# Patient Record
Sex: MALE | Race: WHITE
[De-identification: names, ages, dates, MRNs, and addresses within clinical notes are randomized per-mention and may not be internally consistent; named-entity substitution may affect disease eponyms.]

---

## 2017-12-22 ENCOUNTER — HOSPITAL ENCOUNTER (INPATIENT)
Dept: HOSPITAL 8 - 4EST | Age: 82
LOS: 6 days | Discharge: NURSING, REHAB OR LONG-TERM CARE | DRG: 56 | End: 2017-12-28
Attending: HOSPITALIST | Admitting: HOSPITALIST
Payer: MEDICARE

## 2017-12-22 VITALS — SYSTOLIC BLOOD PRESSURE: 146 MMHG | DIASTOLIC BLOOD PRESSURE: 79 MMHG

## 2017-12-22 VITALS — SYSTOLIC BLOOD PRESSURE: 167 MMHG | DIASTOLIC BLOOD PRESSURE: 87 MMHG

## 2017-12-22 VITALS — BODY MASS INDEX: 19.89 KG/M2 | HEIGHT: 72 IN | WEIGHT: 146.83 LBS

## 2017-12-22 DIAGNOSIS — F01.51: ICD-10-CM

## 2017-12-22 DIAGNOSIS — G93.41: ICD-10-CM

## 2017-12-22 DIAGNOSIS — R32: ICD-10-CM

## 2017-12-22 DIAGNOSIS — K59.00: ICD-10-CM

## 2017-12-22 DIAGNOSIS — M54.30: ICD-10-CM

## 2017-12-22 DIAGNOSIS — E03.9: ICD-10-CM

## 2017-12-22 DIAGNOSIS — I35.0: ICD-10-CM

## 2017-12-22 DIAGNOSIS — Z90.49: ICD-10-CM

## 2017-12-22 DIAGNOSIS — I10: ICD-10-CM

## 2017-12-22 DIAGNOSIS — G30.9: Primary | ICD-10-CM

## 2017-12-22 DIAGNOSIS — F22: ICD-10-CM

## 2017-12-22 DIAGNOSIS — Z79.899: ICD-10-CM

## 2017-12-22 DIAGNOSIS — J84.9: ICD-10-CM

## 2017-12-22 DIAGNOSIS — Z90.79: ICD-10-CM

## 2017-12-22 DIAGNOSIS — F32.9: ICD-10-CM

## 2017-12-22 DIAGNOSIS — H91.93: ICD-10-CM

## 2017-12-22 DIAGNOSIS — Z79.82: ICD-10-CM

## 2017-12-22 DIAGNOSIS — Z86.73: ICD-10-CM

## 2017-12-22 DIAGNOSIS — Z95.5: ICD-10-CM

## 2017-12-22 DIAGNOSIS — E78.5: ICD-10-CM

## 2017-12-22 DIAGNOSIS — Z86.79: ICD-10-CM

## 2017-12-22 DIAGNOSIS — F05: ICD-10-CM

## 2017-12-22 DIAGNOSIS — Z66: ICD-10-CM

## 2017-12-22 DIAGNOSIS — Z85.46: ICD-10-CM

## 2017-12-22 LAB
ANION GAP SERPL CALC-SCNC: 8 MMOL/L (ref 5–15)
BASOPHILS # BLD AUTO: 0.07 X10^3/UL (ref 0–0.1)
BASOPHILS NFR BLD AUTO: 1 % (ref 0–1)
CALCIUM SERPL-MCNC: 8.6 MG/DL (ref 8.5–10.1)
CHLORIDE SERPL-SCNC: 107 MMOL/L (ref 98–107)
CREAT SERPL-MCNC: 1 MG/DL (ref 0.7–1.3)
CULTURE INDICATED?: NO
EOSINOPHIL # BLD AUTO: 0.07 X10^3/UL (ref 0–0.4)
EOSINOPHIL NFR BLD AUTO: 1 % (ref 1–7)
ERYTHROCYTE [DISTWIDTH] IN BLOOD BY AUTOMATED COUNT: 15.3 % (ref 9.4–14.8)
LYMPHOCYTES # BLD AUTO: 0.84 X10^3/UL (ref 1–3.4)
LYMPHOCYTES NFR BLD AUTO: 10 % (ref 22–44)
MCH RBC QN AUTO: 32.1 PG (ref 27.5–34.5)
MCHC RBC AUTO-ENTMCNC: 33.9 G/DL (ref 33.2–36.2)
MCV RBC AUTO: 95 FL (ref 81–97)
MD: NO
MICROSCOPIC: (no result)
MONOCYTES # BLD AUTO: 0.8 X10^3/UL (ref 0.2–0.8)
MONOCYTES NFR BLD AUTO: 9 % (ref 2–9)
NEUTROPHILS # BLD AUTO: 6.88 X10^3/UL (ref 1.8–6.8)
NEUTROPHILS NFR BLD AUTO: 79 % (ref 42–75)
PLATELET # BLD AUTO: 159 X10^3/UL (ref 130–400)
PMV BLD AUTO: 8.8 FL (ref 7.4–10.4)
RBC # BLD AUTO: 4.43 X10^6/UL (ref 4.38–5.82)
T4 FREE SERPL-MCNC: 0.97 NG/DL (ref 0.76–1.46)
TROPONIN I SERPL-MCNC: 0.05 NG/ML (ref 0–0.04)
TROPONIN I SERPL-MCNC: 0.07 NG/ML (ref 0–0.04)
TSH SERPL-ACNC: 18.7 MIU/L (ref 0.36–3.74)

## 2017-12-22 PROCEDURE — 85025 COMPLETE CBC W/AUTO DIFF WBC: CPT

## 2017-12-22 PROCEDURE — 82565 ASSAY OF CREATININE: CPT

## 2017-12-22 PROCEDURE — 81001 URINALYSIS AUTO W/SCOPE: CPT

## 2017-12-22 PROCEDURE — 84443 ASSAY THYROID STIM HORMONE: CPT

## 2017-12-22 PROCEDURE — 71010: CPT

## 2017-12-22 PROCEDURE — 0T9B70Z DRAINAGE OF BLADDER WITH DRAINAGE DEVICE, VIA NATURAL OR ARTIFICIAL OPENING: ICD-10-PCS | Performed by: NURSE PRACTITIONER

## 2017-12-22 PROCEDURE — 80053 COMPREHEN METABOLIC PANEL: CPT

## 2017-12-22 PROCEDURE — 80048 BASIC METABOLIC PNL TOTAL CA: CPT

## 2017-12-22 PROCEDURE — 95819 EEG AWAKE AND ASLEEP: CPT

## 2017-12-22 PROCEDURE — 36415 COLL VENOUS BLD VENIPUNCTURE: CPT

## 2017-12-22 PROCEDURE — 84484 ASSAY OF TROPONIN QUANT: CPT

## 2017-12-22 PROCEDURE — 84439 ASSAY OF FREE THYROXINE: CPT

## 2017-12-22 RX ADMIN — SODIUM CHLORIDE SCH MLS/HR: 0.9 INJECTION, SOLUTION INTRAVENOUS at 18:09

## 2017-12-22 RX ADMIN — ENOXAPARIN SODIUM SCH MG: 40 INJECTION SUBCUTANEOUS at 22:55

## 2017-12-23 VITALS — SYSTOLIC BLOOD PRESSURE: 174 MMHG | DIASTOLIC BLOOD PRESSURE: 86 MMHG

## 2017-12-23 VITALS — SYSTOLIC BLOOD PRESSURE: 158 MMHG | DIASTOLIC BLOOD PRESSURE: 78 MMHG

## 2017-12-23 VITALS — SYSTOLIC BLOOD PRESSURE: 150 MMHG | DIASTOLIC BLOOD PRESSURE: 83 MMHG

## 2017-12-23 LAB
ALBUMIN SERPL-MCNC: 3.2 G/DL (ref 3.4–5)
ALP SERPL-CCNC: 65 U/L (ref 45–117)
ALT SERPL-CCNC: 40 U/L (ref 12–78)
ANION GAP SERPL CALC-SCNC: 10 MMOL/L (ref 5–15)
BASOPHILS # BLD AUTO: 0 X10^3/UL (ref 0–0.1)
BASOPHILS NFR BLD AUTO: 0 % (ref 0–1)
BILIRUB SERPL-MCNC: 1 MG/DL (ref 0.2–1)
CALCIUM SERPL-MCNC: 8.7 MG/DL (ref 8.5–10.1)
CHLORIDE SERPL-SCNC: 107 MMOL/L (ref 98–107)
CREAT SERPL-MCNC: 0.85 MG/DL (ref 0.7–1.3)
EOSINOPHIL # BLD AUTO: 0.07 X10^3/UL (ref 0–0.4)
EOSINOPHIL NFR BLD AUTO: 1 % (ref 1–7)
ERYTHROCYTE [DISTWIDTH] IN BLOOD BY AUTOMATED COUNT: 15.2 % (ref 9.4–14.8)
LYMPHOCYTES # BLD AUTO: 0.81 X10^3/UL (ref 1–3.4)
LYMPHOCYTES NFR BLD AUTO: 8 % (ref 22–44)
MCH RBC QN AUTO: 32.6 PG (ref 27.5–34.5)
MCHC RBC AUTO-ENTMCNC: 34.4 G/DL (ref 33.2–36.2)
MCV RBC AUTO: 94.7 FL (ref 81–97)
MD: NO
MONOCYTES # BLD AUTO: 0.81 X10^3/UL (ref 0.2–0.8)
MONOCYTES NFR BLD AUTO: 8 % (ref 2–9)
NEUTROPHILS # BLD AUTO: 8.18 X10^3/UL (ref 1.8–6.8)
NEUTROPHILS NFR BLD AUTO: 83 % (ref 42–75)
PLATELET # BLD AUTO: 163 X10^3/UL (ref 130–400)
PMV BLD AUTO: 9.5 FL (ref 7.4–10.4)
PROT SERPL-MCNC: 6.8 G/DL (ref 6.4–8.2)
RBC # BLD AUTO: 4.48 X10^6/UL (ref 4.38–5.82)

## 2017-12-23 RX ADMIN — LEVOTHYROXINE SODIUM SCH MCG: 125 TABLET ORAL at 10:58

## 2017-12-23 RX ADMIN — SODIUM CHLORIDE SCH MLS/HR: 0.9 INJECTION, SOLUTION INTRAVENOUS at 03:34

## 2017-12-23 RX ADMIN — QUETIAPINE FUMARATE SCH MG: 25 TABLET ORAL at 18:00

## 2017-12-23 RX ADMIN — ENOXAPARIN SODIUM SCH MG: 40 INJECTION SUBCUTANEOUS at 22:10

## 2017-12-23 RX ADMIN — QUETIAPINE FUMARATE SCH MG: 25 TABLET ORAL at 15:30

## 2017-12-23 RX ADMIN — SODIUM CHLORIDE SCH MLS/HR: 0.9 INJECTION, SOLUTION INTRAVENOUS at 13:30

## 2017-12-23 RX ADMIN — QUETIAPINE FUMARATE SCH MG: 25 TABLET ORAL at 10:58

## 2017-12-23 RX ADMIN — MELATONIN TAB 3 MG SCH MG: 3 TAB at 21:00

## 2017-12-23 RX ADMIN — SERTRALINE HYDROCHLORIDE SCH MG: 50 TABLET ORAL at 10:58

## 2017-12-23 RX ADMIN — SODIUM CHLORIDE SCH MLS/HR: 0.9 INJECTION, SOLUTION INTRAVENOUS at 23:30

## 2017-12-24 VITALS — SYSTOLIC BLOOD PRESSURE: 164 MMHG | DIASTOLIC BLOOD PRESSURE: 76 MMHG

## 2017-12-24 VITALS — SYSTOLIC BLOOD PRESSURE: 161 MMHG | DIASTOLIC BLOOD PRESSURE: 80 MMHG

## 2017-12-24 VITALS — DIASTOLIC BLOOD PRESSURE: 79 MMHG | SYSTOLIC BLOOD PRESSURE: 143 MMHG

## 2017-12-24 VITALS — DIASTOLIC BLOOD PRESSURE: 85 MMHG | SYSTOLIC BLOOD PRESSURE: 164 MMHG

## 2017-12-24 RX ADMIN — QUETIAPINE FUMARATE SCH MG: 25 TABLET ORAL at 17:46

## 2017-12-24 RX ADMIN — SERTRALINE HYDROCHLORIDE SCH MG: 50 TABLET ORAL at 09:03

## 2017-12-24 RX ADMIN — MELATONIN TAB 3 MG SCH MG: 3 TAB at 21:00

## 2017-12-24 RX ADMIN — QUETIAPINE FUMARATE SCH MG: 25 TABLET ORAL at 12:02

## 2017-12-24 RX ADMIN — QUETIAPINE FUMARATE SCH MG: 25 TABLET ORAL at 09:03

## 2017-12-24 RX ADMIN — ENOXAPARIN SODIUM SCH MG: 40 INJECTION SUBCUTANEOUS at 21:49

## 2017-12-24 RX ADMIN — LEVOTHYROXINE SODIUM SCH MCG: 125 TABLET ORAL at 09:03

## 2017-12-24 RX ADMIN — SODIUM CHLORIDE SCH MLS/HR: 0.9 INJECTION, SOLUTION INTRAVENOUS at 15:56

## 2017-12-25 VITALS — DIASTOLIC BLOOD PRESSURE: 81 MMHG | SYSTOLIC BLOOD PRESSURE: 148 MMHG

## 2017-12-25 VITALS — SYSTOLIC BLOOD PRESSURE: 150 MMHG | DIASTOLIC BLOOD PRESSURE: 58 MMHG

## 2017-12-25 VITALS — SYSTOLIC BLOOD PRESSURE: 143 MMHG | DIASTOLIC BLOOD PRESSURE: 84 MMHG

## 2017-12-25 VITALS — DIASTOLIC BLOOD PRESSURE: 93 MMHG | SYSTOLIC BLOOD PRESSURE: 162 MMHG

## 2017-12-25 RX ADMIN — MELATONIN TAB 3 MG SCH MG: 3 TAB at 20:40

## 2017-12-25 RX ADMIN — SODIUM CHLORIDE SCH MLS/HR: 0.9 INJECTION, SOLUTION INTRAVENOUS at 02:13

## 2017-12-25 RX ADMIN — QUETIAPINE FUMARATE SCH MG: 25 TABLET ORAL at 08:39

## 2017-12-25 RX ADMIN — SERTRALINE HYDROCHLORIDE SCH MG: 50 TABLET ORAL at 08:39

## 2017-12-25 RX ADMIN — ENOXAPARIN SODIUM SCH MG: 40 INJECTION SUBCUTANEOUS at 20:39

## 2017-12-25 RX ADMIN — QUETIAPINE FUMARATE SCH MG: 25 TABLET ORAL at 18:36

## 2017-12-25 RX ADMIN — QUETIAPINE FUMARATE SCH MG: 25 TABLET ORAL at 12:55

## 2017-12-25 RX ADMIN — LEVOTHYROXINE SODIUM SCH MCG: 125 TABLET ORAL at 08:40

## 2017-12-26 VITALS — SYSTOLIC BLOOD PRESSURE: 145 MMHG | DIASTOLIC BLOOD PRESSURE: 72 MMHG

## 2017-12-26 VITALS — SYSTOLIC BLOOD PRESSURE: 144 MMHG | DIASTOLIC BLOOD PRESSURE: 74 MMHG

## 2017-12-26 VITALS — SYSTOLIC BLOOD PRESSURE: 173 MMHG | DIASTOLIC BLOOD PRESSURE: 75 MMHG

## 2017-12-26 VITALS — SYSTOLIC BLOOD PRESSURE: 178 MMHG | DIASTOLIC BLOOD PRESSURE: 69 MMHG

## 2017-12-26 LAB — CREAT SERPL-MCNC: 0.96 MG/DL (ref 0.7–1.3)

## 2017-12-26 RX ADMIN — MELATONIN TAB 3 MG SCH MG: 3 TAB at 19:59

## 2017-12-26 RX ADMIN — QUETIAPINE FUMARATE SCH MG: 25 TABLET ORAL at 08:17

## 2017-12-26 RX ADMIN — QUETIAPINE FUMARATE SCH MG: 25 TABLET ORAL at 18:07

## 2017-12-26 RX ADMIN — QUETIAPINE FUMARATE SCH MG: 25 TABLET ORAL at 12:08

## 2017-12-26 RX ADMIN — LEVOTHYROXINE SODIUM SCH MCG: 125 TABLET ORAL at 08:17

## 2017-12-26 RX ADMIN — SERTRALINE HYDROCHLORIDE SCH MG: 50 TABLET ORAL at 08:17

## 2017-12-26 RX ADMIN — ENOXAPARIN SODIUM SCH MG: 40 INJECTION SUBCUTANEOUS at 19:57

## 2017-12-27 VITALS — DIASTOLIC BLOOD PRESSURE: 99 MMHG | SYSTOLIC BLOOD PRESSURE: 178 MMHG

## 2017-12-27 VITALS — DIASTOLIC BLOOD PRESSURE: 84 MMHG | SYSTOLIC BLOOD PRESSURE: 188 MMHG

## 2017-12-27 VITALS — DIASTOLIC BLOOD PRESSURE: 87 MMHG | SYSTOLIC BLOOD PRESSURE: 148 MMHG

## 2017-12-27 VITALS — SYSTOLIC BLOOD PRESSURE: 150 MMHG | DIASTOLIC BLOOD PRESSURE: 87 MMHG

## 2017-12-27 VITALS — DIASTOLIC BLOOD PRESSURE: 91 MMHG | SYSTOLIC BLOOD PRESSURE: 153 MMHG

## 2017-12-27 RX ADMIN — QUETIAPINE FUMARATE SCH MG: 25 TABLET ORAL at 16:41

## 2017-12-27 RX ADMIN — QUETIAPINE FUMARATE SCH MG: 25 TABLET ORAL at 08:51

## 2017-12-27 RX ADMIN — ENOXAPARIN SODIUM SCH MG: 40 INJECTION SUBCUTANEOUS at 21:00

## 2017-12-27 RX ADMIN — SERTRALINE HYDROCHLORIDE SCH MG: 50 TABLET ORAL at 08:50

## 2017-12-27 RX ADMIN — LEVOTHYROXINE SODIUM SCH MCG: 125 TABLET ORAL at 08:51

## 2017-12-27 RX ADMIN — QUETIAPINE FUMARATE SCH MG: 25 TABLET ORAL at 11:35

## 2017-12-27 RX ADMIN — MELATONIN TAB 3 MG SCH MG: 3 TAB at 21:00

## 2017-12-28 VITALS — SYSTOLIC BLOOD PRESSURE: 153 MMHG | DIASTOLIC BLOOD PRESSURE: 77 MMHG

## 2017-12-28 VITALS — DIASTOLIC BLOOD PRESSURE: 81 MMHG | SYSTOLIC BLOOD PRESSURE: 143 MMHG

## 2017-12-28 RX ADMIN — QUETIAPINE FUMARATE SCH MG: 25 TABLET ORAL at 16:34

## 2017-12-28 RX ADMIN — LEVOTHYROXINE SODIUM SCH MCG: 125 TABLET ORAL at 09:51

## 2017-12-28 RX ADMIN — SERTRALINE HYDROCHLORIDE SCH MG: 50 TABLET ORAL at 09:50

## 2017-12-28 RX ADMIN — QUETIAPINE FUMARATE SCH MG: 25 TABLET ORAL at 09:50

## 2017-12-28 RX ADMIN — QUETIAPINE FUMARATE SCH MG: 25 TABLET ORAL at 12:25

## 2018-01-31 ENCOUNTER — HOSPITAL ENCOUNTER (INPATIENT)
Dept: HOSPITAL 8 - ED | Age: 83
LOS: 5 days | Discharge: NURSING, REHAB OR LONG-TERM CARE | DRG: 884 | End: 2018-02-05
Attending: INTERNAL MEDICINE | Admitting: INTERNAL MEDICINE
Payer: MEDICARE

## 2018-01-31 VITALS — DIASTOLIC BLOOD PRESSURE: 77 MMHG | SYSTOLIC BLOOD PRESSURE: 158 MMHG

## 2018-01-31 VITALS — WEIGHT: 137.57 LBS | HEIGHT: 70 IN | BODY MASS INDEX: 19.69 KG/M2

## 2018-01-31 DIAGNOSIS — F43.21: ICD-10-CM

## 2018-01-31 DIAGNOSIS — F43.24: ICD-10-CM

## 2018-01-31 DIAGNOSIS — F32.9: ICD-10-CM

## 2018-01-31 DIAGNOSIS — Z85.46: ICD-10-CM

## 2018-01-31 DIAGNOSIS — Z95.5: ICD-10-CM

## 2018-01-31 DIAGNOSIS — R33.8: ICD-10-CM

## 2018-01-31 DIAGNOSIS — E03.9: ICD-10-CM

## 2018-01-31 DIAGNOSIS — Z66: ICD-10-CM

## 2018-01-31 DIAGNOSIS — N40.1: ICD-10-CM

## 2018-01-31 DIAGNOSIS — I25.10: ICD-10-CM

## 2018-01-31 DIAGNOSIS — E44.1: ICD-10-CM

## 2018-01-31 DIAGNOSIS — D64.9: ICD-10-CM

## 2018-01-31 DIAGNOSIS — F01.51: Primary | ICD-10-CM

## 2018-01-31 DIAGNOSIS — G93.40: ICD-10-CM

## 2018-01-31 DIAGNOSIS — F41.9: ICD-10-CM

## 2018-01-31 DIAGNOSIS — F22: ICD-10-CM

## 2018-01-31 DIAGNOSIS — I45.81: ICD-10-CM

## 2018-01-31 DIAGNOSIS — K22.2: ICD-10-CM

## 2018-01-31 DIAGNOSIS — Z86.79: ICD-10-CM

## 2018-01-31 LAB
ALBUMIN SERPL-MCNC: 3.3 G/DL (ref 3.4–5)
ANION GAP SERPL CALC-SCNC: 8 MMOL/L (ref 5–15)
BASOPHILS # BLD AUTO: 0.04 X10^3/UL (ref 0–0.1)
BASOPHILS NFR BLD AUTO: 1 % (ref 0–1)
CALCIUM SERPL-MCNC: 8.5 MG/DL (ref 8.5–10.1)
CHLORIDE SERPL-SCNC: 103 MMOL/L (ref 98–107)
CREAT SERPL-MCNC: 0.97 MG/DL (ref 0.7–1.3)
EOSINOPHIL # BLD AUTO: 0.07 X10^3/UL (ref 0–0.4)
EOSINOPHIL NFR BLD AUTO: 1 % (ref 1–7)
ERYTHROCYTE [DISTWIDTH] IN BLOOD BY AUTOMATED COUNT: 15.9 % (ref 9.4–14.8)
FOLATE SERPL-MCNC: 14.5 NG/ML (ref 3.1–17.5)
LYMPHOCYTES # BLD AUTO: 0.87 X10^3/UL (ref 1–3.4)
LYMPHOCYTES NFR BLD AUTO: 13 % (ref 22–44)
MCH RBC QN AUTO: 32.2 PG (ref 27.5–34.5)
MCHC RBC AUTO-ENTMCNC: 33.6 G/DL (ref 33.2–36.2)
MCV RBC AUTO: 95.8 FL (ref 81–97)
MD: NO
MICROSCOPIC: (no result)
MONOCYTES # BLD AUTO: 0.45 X10^3/UL (ref 0.2–0.8)
MONOCYTES NFR BLD AUTO: 7 % (ref 2–9)
NEUTROPHILS # BLD AUTO: 5.26 X10^3/UL (ref 1.8–6.8)
NEUTROPHILS NFR BLD AUTO: 79 % (ref 42–75)
PLATELET # BLD AUTO: 213 X10^3/UL (ref 130–400)
PMV BLD AUTO: 8.1 FL (ref 7.4–10.4)
RBC # BLD AUTO: 3.82 X10^6/UL (ref 4.38–5.82)
T4 (THYROXINE): 10.1 MCG/DL (ref 4.5–12.1)
TSH SERPL-ACNC: 0.32 MIU/L (ref 0.36–3.74)

## 2018-01-31 PROCEDURE — 81003 URINALYSIS AUTO W/O SCOPE: CPT

## 2018-01-31 PROCEDURE — 36415 COLL VENOUS BLD VENIPUNCTURE: CPT

## 2018-01-31 PROCEDURE — 84436 ASSAY OF TOTAL THYROXINE: CPT

## 2018-01-31 PROCEDURE — 80053 COMPREHEN METABOLIC PANEL: CPT

## 2018-01-31 PROCEDURE — 71045 X-RAY EXAM CHEST 1 VIEW: CPT

## 2018-01-31 PROCEDURE — 86480 TB TEST CELL IMMUN MEASURE: CPT

## 2018-01-31 PROCEDURE — 82746 ASSAY OF FOLIC ACID SERUM: CPT

## 2018-01-31 PROCEDURE — 82040 ASSAY OF SERUM ALBUMIN: CPT

## 2018-01-31 PROCEDURE — 85025 COMPLETE CBC W/AUTO DIFF WBC: CPT

## 2018-01-31 PROCEDURE — 84443 ASSAY THYROID STIM HORMONE: CPT

## 2018-01-31 PROCEDURE — 82607 VITAMIN B-12: CPT

## 2018-01-31 PROCEDURE — 80048 BASIC METABOLIC PNL TOTAL CA: CPT

## 2018-01-31 PROCEDURE — 93005 ELECTROCARDIOGRAM TRACING: CPT

## 2018-01-31 PROCEDURE — 99285 EMERGENCY DEPT VISIT HI MDM: CPT

## 2018-01-31 RX ADMIN — ACYCLOVIR SCH MG: 200 CAPSULE ORAL at 20:26

## 2018-01-31 RX ADMIN — MELATONIN TAB 3 MG SCH MG: 3 TAB at 20:27

## 2018-01-31 RX ADMIN — SODIUM CHLORIDE, PRESERVATIVE FREE SCH ML: 5 INJECTION INTRAVENOUS at 20:27

## 2018-01-31 RX ADMIN — ENOXAPARIN SODIUM SCH MG: 40 INJECTION SUBCUTANEOUS at 20:30

## 2018-01-31 RX ADMIN — QUETIAPINE FUMARATE SCH MG: 25 TABLET ORAL at 20:26

## 2018-02-01 VITALS — DIASTOLIC BLOOD PRESSURE: 81 MMHG | SYSTOLIC BLOOD PRESSURE: 169 MMHG

## 2018-02-01 VITALS — SYSTOLIC BLOOD PRESSURE: 148 MMHG | DIASTOLIC BLOOD PRESSURE: 77 MMHG

## 2018-02-01 VITALS — DIASTOLIC BLOOD PRESSURE: 68 MMHG | SYSTOLIC BLOOD PRESSURE: 116 MMHG

## 2018-02-01 VITALS — SYSTOLIC BLOOD PRESSURE: 130 MMHG | DIASTOLIC BLOOD PRESSURE: 74 MMHG

## 2018-02-01 VITALS — DIASTOLIC BLOOD PRESSURE: 72 MMHG | SYSTOLIC BLOOD PRESSURE: 177 MMHG

## 2018-02-01 LAB
ALBUMIN SERPL-MCNC: 3.5 G/DL (ref 3.4–5)
ALP SERPL-CCNC: 80 U/L (ref 45–117)
ALT SERPL-CCNC: 26 U/L (ref 12–78)
ANION GAP SERPL CALC-SCNC: 6 MMOL/L (ref 5–15)
BASOPHILS # BLD AUTO: 0.02 X10^3/UL (ref 0–0.1)
BASOPHILS NFR BLD AUTO: 0 % (ref 0–1)
BILIRUB SERPL-MCNC: 0.9 MG/DL (ref 0.2–1)
CALCIUM SERPL-MCNC: 8.7 MG/DL (ref 8.5–10.1)
CHLORIDE SERPL-SCNC: 103 MMOL/L (ref 98–107)
CREAT SERPL-MCNC: 1.01 MG/DL (ref 0.7–1.3)
EOSINOPHIL # BLD AUTO: 0.05 X10^3/UL (ref 0–0.4)
EOSINOPHIL NFR BLD AUTO: 1 % (ref 1–7)
ERYTHROCYTE [DISTWIDTH] IN BLOOD BY AUTOMATED COUNT: 15.6 % (ref 9.4–14.8)
LYMPHOCYTES # BLD AUTO: 0.97 X10^3/UL (ref 1–3.4)
LYMPHOCYTES NFR BLD AUTO: 10 % (ref 22–44)
MCH RBC QN AUTO: 32.1 PG (ref 27.5–34.5)
MCHC RBC AUTO-ENTMCNC: 33.3 G/DL (ref 33.2–36.2)
MCV RBC AUTO: 96.4 FL (ref 81–97)
MD: NO
MONOCYTES # BLD AUTO: 0.81 X10^3/UL (ref 0.2–0.8)
MONOCYTES NFR BLD AUTO: 9 % (ref 2–9)
NEUTROPHILS # BLD AUTO: 7.62 X10^3/UL (ref 1.8–6.8)
NEUTROPHILS NFR BLD AUTO: 80 % (ref 42–75)
PLATELET # BLD AUTO: 229 X10^3/UL (ref 130–400)
PMV BLD AUTO: 8.4 FL (ref 7.4–10.4)
PROT SERPL-MCNC: 7.3 G/DL (ref 6.4–8.2)
RBC # BLD AUTO: 4.21 X10^6/UL (ref 4.38–5.82)

## 2018-02-01 RX ADMIN — ENOXAPARIN SODIUM SCH MG: 40 INJECTION SUBCUTANEOUS at 21:00

## 2018-02-01 RX ADMIN — FINASTERIDE SCH MG: 5 TABLET, FILM COATED ORAL at 08:26

## 2018-02-01 RX ADMIN — SODIUM CHLORIDE, PRESERVATIVE FREE SCH ML: 5 INJECTION INTRAVENOUS at 21:00

## 2018-02-01 RX ADMIN — DIVALPROEX SODIUM SCH MG: 125 CAPSULE, COATED PELLETS ORAL at 17:01

## 2018-02-01 RX ADMIN — SODIUM CHLORIDE, PRESERVATIVE FREE SCH ML: 5 INJECTION INTRAVENOUS at 08:25

## 2018-02-01 RX ADMIN — SERTRALINE HYDROCHLORIDE SCH MG: 50 TABLET ORAL at 08:25

## 2018-02-01 RX ADMIN — QUETIAPINE FUMARATE SCH MG: 25 TABLET ORAL at 15:16

## 2018-02-01 RX ADMIN — QUETIAPINE FUMARATE SCH MG: 25 TABLET ORAL at 23:48

## 2018-02-01 RX ADMIN — QUETIAPINE FUMARATE SCH MG: 25 TABLET ORAL at 08:25

## 2018-02-01 RX ADMIN — ACYCLOVIR SCH MG: 200 CAPSULE ORAL at 08:25

## 2018-02-01 RX ADMIN — LEVOTHYROXINE SODIUM SCH MCG: 125 TABLET ORAL at 08:25

## 2018-02-01 RX ADMIN — QUETIAPINE FUMARATE PRN MG: 25 TABLET ORAL at 15:16

## 2018-02-01 RX ADMIN — MELATONIN TAB 3 MG SCH MG: 3 TAB at 23:48

## 2018-02-01 RX ADMIN — BUDESONIDE AND FORMOTEROL FUMARATE DIHYDRATE SCH MG: 160; 4.5 AEROSOL RESPIRATORY (INHALATION) at 08:27

## 2018-02-02 VITALS — SYSTOLIC BLOOD PRESSURE: 106 MMHG | DIASTOLIC BLOOD PRESSURE: 67 MMHG

## 2018-02-02 VITALS — DIASTOLIC BLOOD PRESSURE: 67 MMHG | SYSTOLIC BLOOD PRESSURE: 105 MMHG

## 2018-02-02 VITALS — SYSTOLIC BLOOD PRESSURE: 159 MMHG | DIASTOLIC BLOOD PRESSURE: 74 MMHG

## 2018-02-02 VITALS — DIASTOLIC BLOOD PRESSURE: 81 MMHG | SYSTOLIC BLOOD PRESSURE: 155 MMHG

## 2018-02-02 RX ADMIN — BUDESONIDE AND FORMOTEROL FUMARATE DIHYDRATE SCH MG: 160; 4.5 AEROSOL RESPIRATORY (INHALATION) at 09:00

## 2018-02-02 RX ADMIN — SERTRALINE HYDROCHLORIDE SCH MG: 50 TABLET ORAL at 10:01

## 2018-02-02 RX ADMIN — LEVOTHYROXINE SODIUM SCH MCG: 125 TABLET ORAL at 10:01

## 2018-02-02 RX ADMIN — DIVALPROEX SODIUM SCH MG: 125 CAPSULE, COATED PELLETS ORAL at 18:30

## 2018-02-02 RX ADMIN — SODIUM CHLORIDE, PRESERVATIVE FREE SCH ML: 5 INJECTION INTRAVENOUS at 09:00

## 2018-02-02 RX ADMIN — DIVALPROEX SODIUM SCH MG: 125 CAPSULE, COATED PELLETS ORAL at 13:56

## 2018-02-02 RX ADMIN — QUETIAPINE FUMARATE SCH MG: 25 TABLET ORAL at 18:30

## 2018-02-02 RX ADMIN — ACYCLOVIR SCH MG: 200 CAPSULE ORAL at 10:00

## 2018-02-02 RX ADMIN — QUETIAPINE FUMARATE SCH MG: 25 TABLET ORAL at 10:01

## 2018-02-02 RX ADMIN — SODIUM CHLORIDE, PRESERVATIVE FREE SCH ML: 5 INJECTION INTRAVENOUS at 20:06

## 2018-02-02 RX ADMIN — FINASTERIDE SCH MG: 5 TABLET, FILM COATED ORAL at 09:00

## 2018-02-03 VITALS — SYSTOLIC BLOOD PRESSURE: 130 MMHG | DIASTOLIC BLOOD PRESSURE: 76 MMHG

## 2018-02-03 VITALS — SYSTOLIC BLOOD PRESSURE: 158 MMHG | DIASTOLIC BLOOD PRESSURE: 74 MMHG

## 2018-02-03 VITALS — DIASTOLIC BLOOD PRESSURE: 74 MMHG | SYSTOLIC BLOOD PRESSURE: 83 MMHG

## 2018-02-03 VITALS — SYSTOLIC BLOOD PRESSURE: 143 MMHG | DIASTOLIC BLOOD PRESSURE: 68 MMHG

## 2018-02-03 RX ADMIN — BUDESONIDE AND FORMOTEROL FUMARATE DIHYDRATE SCH MG: 160; 4.5 AEROSOL RESPIRATORY (INHALATION) at 09:00

## 2018-02-03 RX ADMIN — MELATONIN TAB 3 MG SCH MG: 3 TAB at 00:31

## 2018-02-03 RX ADMIN — DIVALPROEX SODIUM SCH MG: 125 CAPSULE, COATED PELLETS ORAL at 09:50

## 2018-02-03 RX ADMIN — QUETIAPINE FUMARATE SCH MG: 25 TABLET ORAL at 15:01

## 2018-02-03 RX ADMIN — ACYCLOVIR SCH MG: 200 CAPSULE ORAL at 09:50

## 2018-02-03 RX ADMIN — ENOXAPARIN SODIUM SCH MG: 40 INJECTION SUBCUTANEOUS at 00:32

## 2018-02-03 RX ADMIN — QUETIAPINE FUMARATE SCH MG: 25 TABLET ORAL at 23:14

## 2018-02-03 RX ADMIN — SODIUM CHLORIDE, PRESERVATIVE FREE SCH ML: 5 INJECTION INTRAVENOUS at 09:00

## 2018-02-03 RX ADMIN — DIVALPROEX SODIUM SCH MG: 125 CAPSULE, COATED PELLETS ORAL at 15:01

## 2018-02-03 RX ADMIN — QUETIAPINE FUMARATE SCH MG: 25 TABLET ORAL at 00:31

## 2018-02-03 RX ADMIN — FINASTERIDE SCH MG: 5 TABLET, FILM COATED ORAL at 09:00

## 2018-02-03 RX ADMIN — LEVOTHYROXINE SODIUM SCH MCG: 125 TABLET ORAL at 09:51

## 2018-02-03 RX ADMIN — SERTRALINE HYDROCHLORIDE SCH MG: 50 TABLET ORAL at 09:50

## 2018-02-03 RX ADMIN — MELATONIN TAB 3 MG SCH MG: 3 TAB at 21:00

## 2018-02-03 RX ADMIN — SODIUM CHLORIDE, PRESERVATIVE FREE SCH ML: 5 INJECTION INTRAVENOUS at 21:00

## 2018-02-03 RX ADMIN — ENOXAPARIN SODIUM SCH MG: 40 INJECTION SUBCUTANEOUS at 23:15

## 2018-02-03 RX ADMIN — QUETIAPINE FUMARATE SCH MG: 25 TABLET ORAL at 09:50

## 2018-02-04 VITALS — SYSTOLIC BLOOD PRESSURE: 129 MMHG | DIASTOLIC BLOOD PRESSURE: 72 MMHG

## 2018-02-04 VITALS — SYSTOLIC BLOOD PRESSURE: 132 MMHG | DIASTOLIC BLOOD PRESSURE: 75 MMHG

## 2018-02-04 VITALS — DIASTOLIC BLOOD PRESSURE: 70 MMHG | SYSTOLIC BLOOD PRESSURE: 129 MMHG

## 2018-02-04 VITALS — SYSTOLIC BLOOD PRESSURE: 123 MMHG | DIASTOLIC BLOOD PRESSURE: 68 MMHG

## 2018-02-04 RX ADMIN — SODIUM CHLORIDE, PRESERVATIVE FREE SCH ML: 5 INJECTION INTRAVENOUS at 09:09

## 2018-02-04 RX ADMIN — MELATONIN TAB 3 MG SCH MG: 3 TAB at 19:58

## 2018-02-04 RX ADMIN — QUETIAPINE FUMARATE SCH MG: 25 TABLET ORAL at 09:10

## 2018-02-04 RX ADMIN — BUDESONIDE AND FORMOTEROL FUMARATE DIHYDRATE SCH MG: 160; 4.5 AEROSOL RESPIRATORY (INHALATION) at 09:00

## 2018-02-04 RX ADMIN — FINASTERIDE SCH MG: 5 TABLET, FILM COATED ORAL at 09:00

## 2018-02-04 RX ADMIN — SODIUM CHLORIDE, PRESERVATIVE FREE SCH ML: 5 INJECTION INTRAVENOUS at 19:59

## 2018-02-04 RX ADMIN — QUETIAPINE FUMARATE SCH MG: 25 TABLET ORAL at 19:57

## 2018-02-04 RX ADMIN — LEVOTHYROXINE SODIUM SCH MCG: 125 TABLET ORAL at 09:09

## 2018-02-04 RX ADMIN — ENOXAPARIN SODIUM SCH MG: 40 INJECTION SUBCUTANEOUS at 19:57

## 2018-02-04 RX ADMIN — QUETIAPINE FUMARATE SCH MG: 25 TABLET ORAL at 16:18

## 2018-02-04 RX ADMIN — SERTRALINE HYDROCHLORIDE SCH MG: 50 TABLET ORAL at 09:10

## 2018-02-04 RX ADMIN — DIVALPROEX SODIUM SCH MG: 125 CAPSULE, COATED PELLETS ORAL at 12:23

## 2018-02-04 RX ADMIN — ACYCLOVIR SCH MG: 200 CAPSULE ORAL at 09:10

## 2018-02-04 RX ADMIN — DIVALPROEX SODIUM SCH MG: 125 CAPSULE, COATED PELLETS ORAL at 09:09

## 2018-02-05 ENCOUNTER — HOSPITAL ENCOUNTER (EMERGENCY)
Dept: HOSPITAL 8 - ED | Age: 83
LOS: 1 days | Discharge: HOME | End: 2018-02-06
Payer: MEDICARE

## 2018-02-05 VITALS — WEIGHT: 165.35 LBS | BODY MASS INDEX: 22.4 KG/M2 | HEIGHT: 72 IN

## 2018-02-05 VITALS — SYSTOLIC BLOOD PRESSURE: 146 MMHG | DIASTOLIC BLOOD PRESSURE: 77 MMHG

## 2018-02-05 VITALS — SYSTOLIC BLOOD PRESSURE: 148 MMHG | DIASTOLIC BLOOD PRESSURE: 80 MMHG

## 2018-02-05 VITALS — SYSTOLIC BLOOD PRESSURE: 160 MMHG | DIASTOLIC BLOOD PRESSURE: 98 MMHG

## 2018-02-05 VITALS — SYSTOLIC BLOOD PRESSURE: 158 MMHG | DIASTOLIC BLOOD PRESSURE: 56 MMHG
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
## 2018-02-05 DIAGNOSIS — S01.111A: Primary | ICD-10-CM
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
## 2018-02-05 LAB
ALBUMIN SERPL-MCNC: 3 G/DL (ref 3.4–5)
ALP SERPL-CCNC: 73 U/L (ref 45–117)
ALT SERPL-CCNC: 24 U/L (ref 12–78)
ANION GAP SERPL CALC-SCNC: 7 MMOL/L (ref 5–15)
BILIRUB SERPL-MCNC: 0.8 MG/DL (ref 0.2–1)
CALCIUM SERPL-MCNC: 8.9 MG/DL (ref 8.5–10.1)
CHLORIDE SERPL-SCNC: 105 MMOL/L (ref 98–107)
CREAT SERPL-MCNC: 0.87 MG/DL (ref 0.7–1.3)
PROT SERPL-MCNC: 7 G/DL (ref 6.4–8.2)

## 2018-02-05 PROCEDURE — 99284 EMERGENCY DEPT VISIT MOD MDM: CPT

## 2018-02-05 PROCEDURE — 12052 INTMD RPR FACE/MM 2.6-5.0 CM: CPT

## 2018-02-05 RX ADMIN — QUETIAPINE FUMARATE PRN MG: 25 TABLET ORAL at 12:23

## 2018-02-05 RX ADMIN — BUDESONIDE AND FORMOTEROL FUMARATE DIHYDRATE SCH MG: 160; 4.5 AEROSOL RESPIRATORY (INHALATION) at 08:19

## 2018-02-05 RX ADMIN — SODIUM CHLORIDE, PRESERVATIVE FREE SCH ML: 5 INJECTION INTRAVENOUS at 08:20

## 2018-02-05 RX ADMIN — QUETIAPINE FUMARATE SCH MG: 25 TABLET ORAL at 08:19

## 2018-02-05 RX ADMIN — FINASTERIDE SCH MG: 5 TABLET, FILM COATED ORAL at 08:19

## 2018-02-05 RX ADMIN — SERTRALINE HYDROCHLORIDE SCH MG: 50 TABLET ORAL at 08:19

## 2018-02-05 RX ADMIN — ACYCLOVIR SCH MG: 200 CAPSULE ORAL at 08:19

## 2018-02-05 RX ADMIN — DIVALPROEX SODIUM SCH MG: 125 CAPSULE, COATED PELLETS ORAL at 12:17

## 2018-02-05 RX ADMIN — DIVALPROEX SODIUM SCH MG: 125 CAPSULE, COATED PELLETS ORAL at 08:18

## 2018-02-05 RX ADMIN — LEVOTHYROXINE SODIUM SCH MCG: 125 TABLET ORAL at 08:18

## 2018-02-21 ENCOUNTER — HOSPITAL ENCOUNTER (EMERGENCY)
Dept: HOSPITAL 8 - ED | Age: 83
LOS: 1 days | Discharge: HOME | End: 2018-02-22
Payer: MEDICARE

## 2018-02-21 VITALS — WEIGHT: 121.25 LBS | HEIGHT: 72 IN | BODY MASS INDEX: 16.42 KG/M2

## 2018-02-21 VITALS — DIASTOLIC BLOOD PRESSURE: 78 MMHG

## 2018-02-21 DIAGNOSIS — G30.1: Primary | ICD-10-CM

## 2018-02-21 DIAGNOSIS — F02.81: ICD-10-CM

## 2018-02-21 DIAGNOSIS — E03.9: ICD-10-CM

## 2018-02-21 PROCEDURE — 99281 EMR DPT VST MAYX REQ PHY/QHP: CPT

## 2018-02-21 PROCEDURE — 99284 EMERGENCY DEPT VISIT MOD MDM: CPT

## 2018-02-22 VITALS — SYSTOLIC BLOOD PRESSURE: 124 MMHG

## 2018-03-03 ENCOUNTER — APPOINTMENT (OUTPATIENT)
Dept: RADIOLOGY | Facility: MEDICAL CENTER | Age: 83
End: 2018-03-03
Attending: EMERGENCY MEDICINE
Payer: MEDICARE

## 2018-03-03 ENCOUNTER — HOSPITAL ENCOUNTER (EMERGENCY)
Facility: MEDICAL CENTER | Age: 83
End: 2018-03-03
Attending: EMERGENCY MEDICINE
Payer: MEDICARE

## 2018-03-03 VITALS
SYSTOLIC BLOOD PRESSURE: 155 MMHG | TEMPERATURE: 98.6 F | DIASTOLIC BLOOD PRESSURE: 82 MMHG | WEIGHT: 180 LBS | OXYGEN SATURATION: 97 % | BODY MASS INDEX: 24.38 KG/M2 | HEART RATE: 52 BPM | HEIGHT: 72 IN | RESPIRATION RATE: 18 BRPM
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
## 2018-03-03 DIAGNOSIS — S00.83XA CONTUSION OF FACE, INITIAL ENCOUNTER: ICD-10-CM

## 2018-03-03 DIAGNOSIS — F03.91 DEMENTIA WITH BEHAVIORAL DISTURBANCE, UNSPECIFIED DEMENTIA TYPE: ICD-10-CM

## 2018-03-03 DIAGNOSIS — S09.90XA CLOSED HEAD INJURY, INITIAL ENCOUNTER: Primary | ICD-10-CM

## 2018-03-03 LAB
ALBUMIN SERPL BCP-MCNC: 3.6 G/DL (ref 3.2–4.9)
ALBUMIN/GLOB SERPL: 1.2 G/DL
ALP SERPL-CCNC: 72 U/L (ref 30–99)
ALT SERPL-CCNC: 11 U/L (ref 2–50)
ANION GAP SERPL CALC-SCNC: 8 MMOL/L (ref 0–11.9)
APTT PPP: 30 SEC (ref 24.7–36)
AST SERPL-CCNC: 18 U/L (ref 12–45)
BASOPHILS # BLD AUTO: 0.8 % (ref 0–1.8)
BASOPHILS # BLD: 0.06 K/UL (ref 0–0.12)
BILIRUB SERPL-MCNC: 0.6 MG/DL (ref 0.1–1.5)
BUN SERPL-MCNC: 14 MG/DL (ref 8–22)
CALCIUM SERPL-MCNC: 9 MG/DL (ref 8.5–10.5)
CHLORIDE SERPL-SCNC: 100 MMOL/L (ref 96–112)
CO2 SERPL-SCNC: 29 MMOL/L (ref 20–33)
CREAT SERPL-MCNC: 0.88 MG/DL (ref 0.5–1.4)
EOSINOPHIL # BLD AUTO: 0.07 K/UL (ref 0–0.51)
EOSINOPHIL NFR BLD: 0.9 % (ref 0–6.9)
ERYTHROCYTE [DISTWIDTH] IN BLOOD BY AUTOMATED COUNT: 54.3 FL (ref 35.9–50)
GLOBULIN SER CALC-MCNC: 3.1 G/DL (ref 1.9–3.5)
GLUCOSE SERPL-MCNC: 108 MG/DL (ref 65–99)
HCT VFR BLD AUTO: 38.5 % (ref 42–52)
HGB BLD-MCNC: 12.7 G/DL (ref 14–18)
IMM GRANULOCYTES # BLD AUTO: 0.02 K/UL (ref 0–0.11)
IMM GRANULOCYTES NFR BLD AUTO: 0.3 % (ref 0–0.9)
INR PPP: 1.07 (ref 0.87–1.13)
LYMPHOCYTES # BLD AUTO: 0.75 K/UL (ref 1–4.8)
LYMPHOCYTES NFR BLD: 10.1 % (ref 22–41)
MCH RBC QN AUTO: 32.4 PG (ref 27–33)
MCHC RBC AUTO-ENTMCNC: 33 G/DL (ref 33.7–35.3)
MCV RBC AUTO: 98.2 FL (ref 81.4–97.8)
MONOCYTES # BLD AUTO: 0.66 K/UL (ref 0–0.85)
MONOCYTES NFR BLD AUTO: 8.8 % (ref 0–13.4)
NEUTROPHILS # BLD AUTO: 5.9 K/UL (ref 1.82–7.42)
NEUTROPHILS NFR BLD: 79.1 % (ref 44–72)
NRBC # BLD AUTO: 0 K/UL
NRBC BLD-RTO: 0 /100 WBC
PLATELET # BLD AUTO: 201 K/UL (ref 164–446)
PMV BLD AUTO: 10.5 FL (ref 9–12.9)
POTASSIUM SERPL-SCNC: 3.8 MMOL/L (ref 3.6–5.5)
PROT SERPL-MCNC: 6.7 G/DL (ref 6–8.2)
PROTHROMBIN TIME: 13.6 SEC (ref 12–14.6)
RBC # BLD AUTO: 3.92 M/UL (ref 4.7–6.1)
SODIUM SERPL-SCNC: 137 MMOL/L (ref 135–145)
TROPONIN I SERPL-MCNC: 0.03 NG/ML (ref 0–0.04)
VALPROATE SERPL-MCNC: 29.4 UG/ML (ref 50–100)
WBC # BLD AUTO: 7.5 K/UL (ref 4.8–10.8)

## 2018-03-03 PROCEDURE — 90715 TDAP VACCINE 7 YRS/> IM: CPT | Performed by: EMERGENCY MEDICINE

## 2018-03-03 PROCEDURE — 84484 ASSAY OF TROPONIN QUANT: CPT

## 2018-03-03 PROCEDURE — 85610 PROTHROMBIN TIME: CPT

## 2018-03-03 PROCEDURE — 94760 N-INVAS EAR/PLS OXIMETRY 1: CPT

## 2018-03-03 PROCEDURE — 80164 ASSAY DIPROPYLACETIC ACD TOT: CPT

## 2018-03-03 PROCEDURE — 70486 CT MAXILLOFACIAL W/O DYE: CPT

## 2018-03-03 PROCEDURE — 96374 THER/PROPH/DIAG INJ IV PUSH: CPT

## 2018-03-03 PROCEDURE — 700111 HCHG RX REV CODE 636 W/ 250 OVERRIDE (IP)

## 2018-03-03 PROCEDURE — 85025 COMPLETE CBC W/AUTO DIFF WBC: CPT

## 2018-03-03 PROCEDURE — 70450 CT HEAD/BRAIN W/O DYE: CPT

## 2018-03-03 PROCEDURE — 93005 ELECTROCARDIOGRAM TRACING: CPT | Performed by: EMERGENCY MEDICINE

## 2018-03-03 PROCEDURE — 700111 HCHG RX REV CODE 636 W/ 250 OVERRIDE (IP): Performed by: EMERGENCY MEDICINE

## 2018-03-03 PROCEDURE — 96376 TX/PRO/DX INJ SAME DRUG ADON: CPT

## 2018-03-03 PROCEDURE — 85730 THROMBOPLASTIN TIME PARTIAL: CPT

## 2018-03-03 PROCEDURE — 99285 EMERGENCY DEPT VISIT HI MDM: CPT

## 2018-03-03 PROCEDURE — 80053 COMPREHEN METABOLIC PANEL: CPT

## 2018-03-03 PROCEDURE — 90471 IMMUNIZATION ADMIN: CPT

## 2018-03-03 PROCEDURE — 72125 CT NECK SPINE W/O DYE: CPT

## 2018-03-03 RX ORDER — QUETIAPINE FUMARATE 25 MG/1
25 TABLET, FILM COATED ORAL 2 TIMES DAILY
COMMUNITY

## 2018-03-03 RX ORDER — DIVALPROEX SODIUM 125 MG/1
125 CAPSULE, COATED PELLETS ORAL 2 TIMES DAILY
COMMUNITY

## 2018-03-03 RX ORDER — LEVOTHYROXINE SODIUM 0.12 MG/1
125 TABLET ORAL
COMMUNITY

## 2018-03-03 RX ORDER — LORAZEPAM 2 MG/ML
0.5 INJECTION INTRAMUSCULAR ONCE
Status: COMPLETED | OUTPATIENT
Start: 2018-03-03 | End: 2018-03-03

## 2018-03-03 RX ORDER — LORAZEPAM 2 MG/ML
INJECTION INTRAMUSCULAR
Status: COMPLETED
Start: 2018-03-03 | End: 2018-03-03

## 2018-03-03 RX ADMIN — LORAZEPAM 0.5 MG: 2 INJECTION INTRAMUSCULAR; INTRAVENOUS at 09:55

## 2018-03-03 RX ADMIN — CLOSTRIDIUM TETANI TOXOID ANTIGEN (FORMALDEHYDE INACTIVATED), CORYNEBACTERIUM DIPHTHERIAE TOXOID ANTIGEN (FORMALDEHYDE INACTIVATED), BORDETELLA PERTUSSIS TOXOID ANTIGEN (GLUTARALDEHYDE INACTIVATED), BORDETELLA PERTUSSIS FILAMENTOUS HEMAGGLUTININ ANTIGEN (FORMALDEHYDE INACTIVATED), BORDETELLA PERTUSSIS PERTACTIN ANTIGEN, AND BORDETELLA PERTUSSIS FIMBRIAE 2/3 ANTIGEN 0.5 ML: 5; 2; 2.5; 5; 3; 5 INJECTION, SUSPENSION INTRAMUSCULAR at 09:56

## 2018-03-03 RX ADMIN — LORAZEPAM 0.5 MG: 2 INJECTION INTRAMUSCULAR; INTRAVENOUS at 10:47

## 2018-03-03 RX ADMIN — LORAZEPAM 0.5 MG: 2 INJECTION INTRAMUSCULAR at 09:55

## 2018-03-03 NOTE — ED PROVIDER NOTES
"ED Provider Note    CHIEF COMPLAINT  Chief Complaint   Patient presents with   • GLF   • Laceration       HPI  Luci Giron is a 87 y.o. male who presents to the emergency department by ambulance from [facility] for head injury. Reported ground-level fall, although unwitnessed, patient states he \"passed out\"although history of severe dementia makes history unreliable. Patient arrives to the dining room with evidence for new headache injury, small laceration to the left temporal region. Old ecchymosis noted by staff from previous fall, unknown if this was evaluated by medical personnel. Patient denies complaints at this time. Denies headache, neck pain, back pain. Denies extremity pain. Denies abdominal pain or chest pain. Facility denies use of anticoagulants.  Facility states patient at baseline.    REVIEW OF SYSTEMS  See HPI for further details. All other systems are negative.     PAST MEDICAL HISTORY   dementia    SOCIAL HISTORY  Social History     Social History Main Topics   • Smoking status: Never Smoker   • Smokeless tobacco: Never Used   • Alcohol use No   • Drug use: No   • Sexual activity: Not on file       SURGICAL HISTORY  patient denies any surgical history    CURRENT MEDICATIONS  Home Medications     Reviewed by Rebeka Shepard R.N. (Registered Nurse) on 03/03/18 at 0839  Med List Status: Partial   Medication Last Dose Status   ACYCLOVIR PO  Active   Divalproex Sodium (DEPAKOTE) 125 MG Capsule Delayed Release Sprinkle  Active   levothyroxine (SYNTHROID) 125 MCG Tab  Active   QUEtiapine (SEROQUEL) 25 MG Tab  Active   sertraline (ZOLOFT) 50 MG Tab  Active                ALLERGIES  Allergies   Allergen Reactions   • Linden Extract [Linden Oil]    • Gluten Meal        PHYSICAL EXAM  VITAL SIGNS: /80   Pulse 69   Temp 36.8 °C (98.2 °F)   Resp 16   Ht 1.829 m (6')   Wt 81.6 kg (180 lb)   SpO2 92%   BMI 24.41 kg/m²   Pulse ox interpretation: I interpret this pulse ox as " normal.  Constitutional: Alert in no apparent distress.  HENT: Normocephalic. Multiple stages of ecchymotic lesions to face and forehead, new left temporoparietal hematoma with superficial laceration. Bilateral external ears normal, Nose normal. Moist mucous membranes.   No oral trauma.  Eyes: Pupils are equal and reactive, Conjunctiva normal.   Neck: No midline tenderness to palpation. No step-offs.  Cardiovascular: Regular rate and rhythm, no murmurs. Distal pulses intact.  No peripheral edema.  Thorax & Lungs: Normal breath sounds.  No wheezing/rales/ronchi. No increased work of breathing. No chest wall tenderness, crepitus.  Abdomen: Soft, non-distended, non-tender to palpation. Or peritonitis. No abdominal trauma.  Skin: Warm, Dry, No erythema, No rash.   Musculoskeletal: Good range of motion in all major joints. No major deformities noted.   Neurologic: Alert ,. Oriented to self. Moves 4 extremities spontaneously and without difficulty or focality.  Psychiatric: Calm and cooperative. History of dementia, disoriented, reported baseline.       DIAGNOSTIC STUDIES / PROCEDURES    EKG  I interpreted this EKG myself.  This is a 12-lead study.  The rhythm is sinus with a rate of 66.  There are no ST segment abnormalities. Nonspecific T-wave changes, isolated T-wave inversion in lead 3. First-degree A-V block. Interpretation: No ST segment elevation myocardial infarction. No arrhythmia.    LABS  Results for orders placed or performed during the hospital encounter of 03/03/18   CBC WITH DIFFERENTIAL   Result Value Ref Range    WBC 7.5 4.8 - 10.8 K/uL    RBC 3.92 (L) 4.70 - 6.10 M/uL    Hemoglobin 12.7 (L) 14.0 - 18.0 g/dL    Hematocrit 38.5 (L) 42.0 - 52.0 %    MCV 98.2 (H) 81.4 - 97.8 fL    MCH 32.4 27.0 - 33.0 pg    MCHC 33.0 (L) 33.7 - 35.3 g/dL    RDW 54.3 (H) 35.9 - 50.0 fL    Platelet Count 201 164 - 446 K/uL    MPV 10.5 9.0 - 12.9 fL    Neutrophils-Polys 79.10 (H) 44.00 - 72.00 %    Lymphocytes 10.10 (L) 22.00 -  41.00 %    Monocytes 8.80 0.00 - 13.40 %    Eosinophils 0.90 0.00 - 6.90 %    Basophils 0.80 0.00 - 1.80 %    Immature Granulocytes 0.30 0.00 - 0.90 %    Nucleated RBC 0.00 /100 WBC    Neutrophils (Absolute) 5.90 1.82 - 7.42 K/uL    Lymphs (Absolute) 0.75 (L) 1.00 - 4.80 K/uL    Monos (Absolute) 0.66 0.00 - 0.85 K/uL    Eos (Absolute) 0.07 0.00 - 0.51 K/uL    Baso (Absolute) 0.06 0.00 - 0.12 K/uL    Immature Granulocytes (abs) 0.02 0.00 - 0.11 K/uL    NRBC (Absolute) 0.00 K/uL   COMP METABOLIC PANEL   Result Value Ref Range    Sodium 137 135 - 145 mmol/L    Potassium 3.8 3.6 - 5.5 mmol/L    Chloride 100 96 - 112 mmol/L    Co2 29 20 - 33 mmol/L    Anion Gap 8.0 0.0 - 11.9    Glucose 108 (H) 65 - 99 mg/dL    Bun 14 8 - 22 mg/dL    Creatinine 0.88 0.50 - 1.40 mg/dL    Calcium 9.0 8.5 - 10.5 mg/dL    AST(SGOT) 18 12 - 45 U/L    ALT(SGPT) 11 2 - 50 U/L    Alkaline Phosphatase 72 30 - 99 U/L    Total Bilirubin 0.6 0.1 - 1.5 mg/dL    Albumin 3.6 3.2 - 4.9 g/dL    Total Protein 6.7 6.0 - 8.2 g/dL    Globulin 3.1 1.9 - 3.5 g/dL    A-G Ratio 1.2 g/dL   PROTHROMBIN TIME   Result Value Ref Range    PT 13.6 12.0 - 14.6 sec    INR 1.07 0.87 - 1.13   APTT   Result Value Ref Range    APTT 30.0 24.7 - 36.0 sec   TROPONIN   Result Value Ref Range    Troponin I 0.03 0.00 - 0.04 ng/mL   VALPROIC ACID   Result Value Ref Range    Valproic Acid 29.4 (L) 50.0 - 100.0 ug/mL   ESTIMATED GFR   Result Value Ref Range    GFR If African American >60 >60 mL/min/1.73 m 2    GFR If Non African American >60 >60 mL/min/1.73 m 2   EKG (NOW)   Result Value Ref Range    Report       [facility]    Test Date:  2018  Pt Name:    SHERRON CABA                Department: ER  MRN:        [id number]                      Room:       [facility]  Gender:     Male                         Technician: EDSFHR  :        1930                   Requested By:MARGOTH NGUYEN  Order #:    [id number]                    Reading  "MD:    Measurements  Intervals                                Axis  Rate:       66                           P:          49  VT:         224                          QRS:        35  QRSD:       114                          T:          4  QT:         452  QTc:        474    Interpretive Statements  SINUS RHYTHM  MULTIFORM VENTRICULAR PREMATURE COMPLEXES  FIRST DEGREE AV BLOCK  NONSPECIFIC INTRAVENTRICULAR CONDUCTION DELAY  LEFT VENTRICULAR HYPERTROPHY  BASELINE WANDER IN LEAD(S) V4  No previous ECG available for comparison       RADIOLOGY  CT-MAXILLOFACIAL W/O PLUS RECONS   Final Result      No displaced fracture of the facial bones.      CT-CSPINE WITHOUT PLUS RECONS   Final Result      No CT evidence of acute cervical spine abnormality.      Moderate to severe cervical spondylosis with degenerative C7/T1 minimal anterolisthesis      CT-HEAD W/O   Final Result      No noncontrast CT evidence of acute or subacute intracranial hemorrhage.      Nonspecific white matter changes and cerebral volume loss.          COURSE & MEDICAL DECISION MAKING  Nursing notes and vital signs were reviewed. (See chart for details)  The patients records were reviewed, history was obtained from the patient and EMS;    ED evaluation for head injury is unrevealing. Facial contusion and superficial laceration not requiring suture repair. No radiographic evidence for intracranial trauma, facial fractures are cervical spine fracture or subluxation. Patient is grossly neurologically intact, moves 4 extremities spontaneously and without focality. History of end-stage dementia, he is disoriented but follows commands, although agitated at times. He has combed with small doses of Ativan intravenously, and coordinates with his home medications. Labs are unremarkable. Patient states he \"passed out\" he has history for multiple recurrent falls. Etiology of the fall versus syncope is unknown although additional workup is unrevealing, EKG without " ischemia, continuous telemetry without arrhythmia. Patient is hemodynamically stable without fever, tachycardia or hypotension. Never hypoxic. No clinical evidence for sepsis.    I spoke with patient's daughter and power of , Yuki, who has also discussed options with her sister. Admission was offered for further syncope evaluation as we are unsure of the etiology of the fall today. However, given the patient has end-stage dementia, on hospice they would prefer the patient be transported back to his living facility without further evaluation as his workup is without evidence for new traumatic injury. I think this is very appropriate. Social work has been contacted for transfer.    Patient is stable for discharge at this time, anticipatory guidance provided, to resume home medications, close follow-up is encouraged, and strict ED return instructions have been detailed. Patient is agreeable to the disposition and plan.    FINAL IMPRESSION  (S09.90XA) Closed head injury, initial encounter  (primary encounter diagnosis)  (S00.83XA) Contusion of face, initial encounter  (W19.XXXA) Fall, initial encounter  (F03.91) Dementia with behavioral disturbance, unspecified dementia type      Electronically signed by: Mira Cruz, 3/3/2018 9:32 AM      This dictation was created using voice recognition software. The accuracy of the dictation is limited to the abilities of the software. I expect there may be some errors of grammar and possibly content. The nursing notes were reviewed and certain aspects of this information were incorporated into this note.

## 2018-03-03 NOTE — ED NOTES
MD at bedside prior to d/c. EMT given d/c paperwork, pt unable to sign d/c paperwork. No rx's given. Pt taken back to [facility] via [facility]. Pt took all belongings from room.

## 2018-03-03 NOTE — ED NOTES
MD Cruz notified that pt continues to be agitated after receiving ativan as ordered. See new orders.

## 2018-03-03 NOTE — ED TRIAGE NOTES
Pt arrived via [facility] from memory care facility. Per EMS pt had unwitnessed GLF from wheelchair. EMS states pt wheeled himself down to breakfast [place] and staff noticed pt had blood on his face/head. Pt has significant bruising to forehead and bilateral cheeks under eyes but EMS states this is from a fall last week.     Pt has POLST in place, DNR and comfort measures selected on copy of paperwork.     Pt has hx of dementia, alert to self and place only. EMS states pt is at baseline. NO blood thinners.

## 2018-03-03 NOTE — DISCHARGE PLANNING
RN reported that the pt was CECILIA MCCANN from [facility] and is cleared to d/c back home. MADELINE called [facility] and spoke with DOMINIC Benson who stated that pt can d/c back to them. MADELINE called [facility] and set up transport through [facility] for [id number].

## 2018-03-03 NOTE — ED NOTES
Spoke to Lamar (POA) and she states pt is on hospice and should have never been transported to ED for evaluation. Pt normally goes to [place] for care. Per Lamar continue with testing that has been ordered and call afterwards with results. States she will call hospice to alert them that pt is in ED. MD Cruz notified.

## 2018-03-03 NOTE — DISCHARGE INSTRUCTIONS
Follow-up with primary care next week for reevaluation, medication management and wound check.    Continue medications as previously indicated. Valproic acid level was subtherapeutic, FI eye.    Activity per routine, recommend ambulation with assistance.    Return to the emergency department for altered mental status, seizure, focal weakness, syncope, fever or other new concerns.    Head Injury, Adult  There are many types of head injuries. They can be as minor as a bump. Some head injuries can be worse. Worse injuries include:  · A strong hit to the head that hurts the brain (concussion).  · A bruise of the brain (contusion). This means there is bleeding in the brain that can cause swelling.  · A cracked skull (skull fracture).  · Bleeding in the brain that gathers, gets thick (makes a clot), and forms a bump (hematoma).  Most problems from a head injury come in the first 24 hours. However, you may still have side effects up to 7-10 days after your injury. It is important to watch your condition for any changes.  Follow these instructions at home:  Activity  · Rest as much as possible.  · Avoid activities that are hard or tiring.  · Make sure you get enough sleep.  · Limit activities that need a lot of thought or attention, such as:  ¨ Watching TV.  ¨ Playing memory games and puzzles.  ¨ Job-related work or homework.  ¨ Working on the computer, social media, and texting.  · Avoid activities that could cause another head injury until your doctor says it is okay. This includes playing sports.  · Ask your doctor when it is safe for you to go back to your normal activities, such as work or school. Ask your doctor for a step-by-step plan for slowly going back to your normal activities.  · Ask your doctor when you can drive, ride a bicycle, or use heavy machinery. Never do these activities if you are dizzy.  Lifestyle  · Do not drink alcohol until your doctor says it is okay.  · Avoid drug use.  · If it is harder than  usual to remember things, write them down.  · If you are easily distracted, try to do one thing at a time.  · Talk with family members or close friends when making important decisions.  · Tell your friends, family, a trusted coworker, and  about your injury, symptoms, and limits (restrictions). Have them watch for any problems that are new or getting worse.  General instructions  · Take over-the-counter and prescription medicines only as told by your doctor.  · Have someone stay with you for 24 hours after your head injury. This person should watch you for any changes in your symptoms and be ready to get help.  · Keep all follow-up visits as told by your doctor. This is important.  Prevention  · Work on your balance and strength. This can help you avoid falls.  · Wear a seatbelt when you are in a moving vehicle.  · Wear a helmet when:  ¨ Riding a bicycle.  ¨ Skiing.  ¨ Doing any other sport or activity that has a risk of injury.  · Drink alcohol only in moderation.  · Make your home safer by:  ¨ Getting rid of clutter from the floors and stairs, like things that can make you trip.  ¨ Using grab bars in bathrooms and handrails by stairs.  ¨ Placing non-slip mats on floors and in bathtubs.  ¨ Putting more light in dim areas.  Get help right away if:  · You have:  ¨ A very bad (severe) headache that is not helped by medicine.  ¨ Trouble walking or weakness in your arms and legs.  ¨ Clear or bloody fluid coming from your nose or ears.  ¨ Changes in your seeing (vision).  ¨ Jerky movements that you cannot control (seizure).  · You throw up (vomit).  · Your symptoms get worse.  · You lose balance.  · Your speech is slurred.  · You pass out.  · You are sleepier and have trouble staying awake.  · The black centers of your eyes (pupils) change in size.  These symptoms may be an emergency. Do not wait to see if the symptoms will go away. Get medical help right away. Call your local emergency services (911 in the  [place] Do not drive yourself to the hospital.   This information is not intended to replace advice given to you by your health care provider. Make sure you discuss any questions you have with your health care provider.  Document Released: 11/30/2009 Document Revised: 07/13/2017 Document Reviewed: 06/27/2017  [employer] Patient Education © 2017 [employer]    Facial or Scalp Contusion  A facial or scalp contusion is a deep bruise on the face or head. Contusions happen when an injury causes bleeding under the skin. Signs of bruising include pain, puffiness (swelling), and discolored skin. The contusion may turn blue, purple, or yellow.  Follow these instructions at home:  · Only take medicines as told by your doctor.  · Put ice on the injured area.  ¨ Put ice in a plastic bag.  ¨ Place a towel between your skin and the bag.  ¨ Leave the ice on for 20 minutes, 2-3 times a day.  Contact a doctor if:  · You have bite problems.  · You have pain when chewing.  · You are worried about your face not healing normally.  Get help right away if:  · You have severe pain or a headache and medicine does not help.  · You are very tired or confused, or your personality changes.  · You throw up (vomit).  · You have a nosebleed that will not stop.  · You see two of everything (double vision) or have blurry vision.  · You have fluid coming from your nose or ear.  · You have problems walking or using your arms or legs.  This information is not intended to replace advice given to you by your health care provider. Make sure you discuss any questions you have with your health care provider.  Document Released: 12/06/2012 Document Revised: 08/24/2017 Document Reviewed: 07/31/2014  [employer] Patient Education © 2017 [employer]

## 2018-03-26 LAB — EKG IMPRESSION: NORMAL

## 2021-01-14 DIAGNOSIS — Z23 NEED FOR VACCINATION: ICD-10-CM
